# Patient Record
(demographics unavailable — no encounter records)

---

## 2020-02-06 NOTE — PROCNOTE
[facility] OB: Delivery Note





- Delivery


  ** A


Date of Birth: 20


Time of Birth: 21:56


 Sex: Male - Gwendolyn


[place] Weight at Birth: 8 lb 7 oz


Apgar Score 1 Minute: 9


Apgar Score 5 Minutes: 9


Gestational Age in Weeks and Days at Delivery: 39 Weeks and 0 Days


Delivery Method: Spontaneous Vaginal


Labor: Induced


Did Patient attempt ?: N/A, No Previous 


Amniotic Fluid: Clear


Estimated Blood Loss: 300


Anesthesia/Analgesia: CEI for Labor


Delivered By: Sarahi Doe





- Nursery


Level of Nursery: Regular/Bedside





- Perineum


Perineal Injury: Abrasion Only - Not Repaired





- Events


Delivery Events of Note: Pitocin During Labor, Full Course of Antibiotics





- Additional Delivery Notes


Additional Delivery Notes: 





Pt came for elective induction at 39wks.  She underwent AROM around 12:30, then 

started pitocin a few hours later and progressed to 5cm and received an 

epidural.  Several hours later she was fully dilated and pushed about 10min to 

deliver the infant's head in BECKY position followed by the shoulders and the 

rest of the body.  The baby was placed on mom's abdomen.  After >1min the cord 

was clamped x2 and cut by mom.  Cord blood collected.  The placenta delivered 

with gentle cord traction and fundal massage and appeared intact.  A small 

increase in bleeding stopped with fundal massage and pitocin initiation.  

Fundus was then palpated as firm with good hemostasis.  A small perineal 

abrastion was hemostatic and therefore not repaired.

## 2020-02-06 NOTE — HP
General Information





- Reason for Visit





induction at term





- General Information


Maternal Age: 31


Grav: 3


Para: 2


SAB: 0


IEA: 0





Estimated Due Date: 20


Determined By: LMP


Maternal Blood Type and Rh: A Negative





- Results this Pregnancy


Serology/RPR Result: Non-Reactive


Rubella Result: Non-Immune


HBsAg Result: Negative


HIV Result: Negative


GBS Culture Result: Positive





Past Medical History


Delivery History: Hx Uncomplicated Vaginal Delivery


Pertinent Past Medical History: See  Records


Pertinent Past Surgical History: See  Records


Pertinent Family History: Non-Contributory





- Antepartal Records


Antepartal Records: Reviewed, Pregnancy Complicated by: - BMI>40





Review of Systems


Constitutional: Comfortable


CV Complaint: No


Respiratory: Shortness of Breath: No


Gastrointestinal: No Nausea/Vomiting


Genitourinary: No Dysuria, No Bleeding, No Leaking Fluid


Musculoskeletal: No Complaint


Neurological: No Headache, No Visual Changes


Fetal Movement: Normal





Exam


Allergies/Adverse Reactions: 


Allergies





shellfish derived Allergy (Severe, Verified 20 12:08)


 Anaphylatic Shock


trazodone Allergy (Severe, Verified 20 12:08)


 Anaphylatic Shock


Sulfa (Sulfonamide Antibiotics) Allergy (Mild, Verified 20 12:08)


 Vomiting








Lab Values - Entire Visit: 


 Laboratory Tests











  20





  12:51 12:51 13:20


 


WBC   9.7 


 


RBC   4.20 


 


Hgb   12.6 


 


Hct   37 


 


MCV   87 


 


MCH   30 


 


MCHC   35 


 


RDW   15 


 


Plt Count   284 


 


MPV   8.6 


 


Neut % (Auto)   79.3 


 


Lymph % (Auto)   16.6 


 


Mono % (Auto)   3.6 


 


Eos % (Auto)   0.3 


 


Baso % (Auto)   0.2 


 


Absolute Neuts (auto)   7.7 


 


Absolute Lymphs (auto)   1.6 


 


Absolute Monos (auto)   0.4 


 


Absolute Eos (auto)   0.0 


 


Absolute Basos (auto)   0.0 


 


Absolute Nucleated RBC   0.0 


 


Nucleated RBC %   0.1 


 


Urine Opiates Screen    None detected


 


Ur Barbiturates Screen    None detected


 


Ur Phencyclidine Scrn    None detected


 


Ur Amphetamines Screen    None detected


 


U Benzodiazepines Scrn    None detected


 


Urine Cocaine Screen    None detected


 


U Cannabinoids Screen    None detected


 


Blood Type  A Negative  


 


Antibody Screen  Negative  














- Measurements


Height: 5 ft 4 in


Weight: 268 lb


Weight in lbs: 268.562672


Body Mass Index (BMI): 46.0


Pre-Pregnancy Weight: 244 lb


Weight Gained This Pregnancy: 24 lbs and 0 ozs





- Exam


Breast: Breast Exam Deferred


Extremities: No Edema


Heart: Normal Rhythm/Heart Sounds


HEENT: No Significant Findings





- Abdominal Exam


Abdomen Exam: Non-Tender, Fundal Height Consistent with Dates





- Ultrasound/Biophysical Profile


Ultrasound Status: Not Done





Targeted Exam Findings


Cervical Exam: 4cm


Effacement: 80%


Station: -1


Presenting Part: Vertex


Membrane Status: AROM - clear


Bleeding/Discharge: None





EFM Findings





- External Fetal Monitor Findings


Baseline Fetal Heart Rate: 130


External Fetal Monitor Findings: Accelerations Present, No Pattern of Variable 

or Late Decelerations, Variability Moderate, Baseline Stable


Contractions: None





Assessment/Plan





- Assessment





 @39wks for induction at term.  





- Obstetrical Risk Factors


Obstetrical Risk Factors: GBS Positive, Obesity





- Plan


Plan: Induction, Antibiotic Prophylaxis

## 2020-02-06 NOTE — PN
Progress Note





- Progress Note


Date of Service: 02/06/20


Note: 


Pt on pitocin and feels some contractions but relatively comfortable. 


Cx: 5/90/-1


FHT: 135bpm, mod patience, +accels, neg decels, Cat 1


A/P:  39wks, ind, cont pit.